# Patient Record
Sex: MALE | Race: WHITE | Employment: OTHER | URBAN - METROPOLITAN AREA
[De-identification: names, ages, dates, MRNs, and addresses within clinical notes are randomized per-mention and may not be internally consistent; named-entity substitution may affect disease eponyms.]

---

## 2017-01-04 NOTE — TELEPHONE ENCOUNTER
Pt is out town patient is prone to blood clots needs medication refilled right away needs 6 day supply      # [phone] please call when sent to pharmacy

## 2017-02-06 ENCOUNTER — HOSPITAL ENCOUNTER (OUTPATIENT)
Dept: GENERAL RADIOLOGY | Age: 57
End: 2017-02-06
Payer: MEDICARE

## 2017-02-06 ENCOUNTER — HOSPITAL ENCOUNTER (OUTPATIENT)
Dept: GENERAL RADIOLOGY | Age: 57
Discharge: HOME | End: 2017-02-06
Attending: PHYSICAL MEDICINE & REHABILITATION
Payer: MEDICARE

## 2017-02-06 DIAGNOSIS — R52 PAIN: ICD-10-CM

## 2017-02-06 PROCEDURE — 73564 X-RAY EXAM KNEE 4 OR MORE: CPT

## 2017-04-14 RX ORDER — VENLAFAXINE HYDROCHLORIDE 75 MG/1
CAPSULE, EXTENDED RELEASE ORAL
Qty: 90 CAP | Refills: 1 | Status: SHIPPED | OUTPATIENT
Start: 2017-04-14

## 2017-05-04 ENCOUNTER — OFFICE VISIT (OUTPATIENT)
Dept: INTERNAL MEDICINE CLINIC | Age: 57
End: 2017-05-04

## 2017-05-04 VITALS
HEART RATE: 100 BPM | HEIGHT: 71 IN | OXYGEN SATURATION: 99 % | BODY MASS INDEX: 31.5 KG/M2 | RESPIRATION RATE: 18 BRPM | SYSTOLIC BLOOD PRESSURE: 130 MMHG | WEIGHT: 225 LBS | DIASTOLIC BLOOD PRESSURE: 85 MMHG | TEMPERATURE: 97.1 F

## 2017-05-04 DIAGNOSIS — I10 ESSENTIAL HYPERTENSION: ICD-10-CM

## 2017-05-04 DIAGNOSIS — F51.01 PRIMARY INSOMNIA: Primary | ICD-10-CM

## 2017-05-04 DIAGNOSIS — M51.36 DDD (DEGENERATIVE DISC DISEASE), LUMBAR: ICD-10-CM

## 2017-05-04 DIAGNOSIS — F90.2 ATTENTION DEFICIT HYPERACTIVITY DISORDER (ADHD), COMBINED TYPE: ICD-10-CM

## 2017-05-04 DIAGNOSIS — C61 PROSTATE CANCER (HCC): ICD-10-CM

## 2017-05-04 RX ORDER — LISINOPRIL 20 MG/1
20 TABLET ORAL DAILY
Qty: 90 TAB | Refills: 1 | Status: SHIPPED | OUTPATIENT
Start: 2017-05-04

## 2017-05-04 RX ORDER — TEMAZEPAM 30 MG/1
30 CAPSULE ORAL
Qty: 90 CAP | Refills: 1 | Status: SHIPPED | OUTPATIENT
Start: 2017-05-04 | End: 2017-07-18 | Stop reason: SDUPTHER

## 2017-05-04 RX ORDER — METHYLPHENIDATE HYDROCHLORIDE 36 MG/1
TABLET ORAL
Qty: 90 TAB | Refills: 0 | Status: SHIPPED | OUTPATIENT
Start: 2017-05-04 | End: 2017-05-04 | Stop reason: SDUPTHER

## 2017-05-04 RX ORDER — METHYLPHENIDATE HYDROCHLORIDE 36 MG/1
TABLET ORAL
Qty: 90 TAB | Refills: 0 | Status: SHIPPED | OUTPATIENT
Start: 2017-08-04

## 2017-05-04 NOTE — PROGRESS NOTES
1. Have you been to the ER, urgent care clinic since your last visit? Hospitalized since your last visit?no    2. Have you seen or consulted any other health care providers outside of the [address] [place] [address] since your last visit? Include any pap smears or colon screening.  No    Chief Complaint   Patient presents with    Hypertension     6 months follow up     Not Fasting

## 2017-05-04 NOTE — PROGRESS NOTES
HISTORY OF PRESENT ILLNESS  Alida Lugo is a 59 y.o. male. HPI  Here for med refills. He is going on a cruise and will be out of his pain meds for a day so needs help here. He has controlled add and needs refills. He has controlled insomnia . He has prostate cancer so is off t now and will proceed with surgery. He is moving to [place] to live near his daughter and grand kids. Past Medical History:   Diagnosis Date    ADHD (attention deficit hyperactivity disorder)     DDD (degenerative disc disease), lumbar     DVT [facility]deep venous thrombosis) ([facility])     ED (erectile dysfunction) of organic origin     GOPAL (generalized anxiety disorder)     Genital herpes     GERD (gastroesophageal reflux disease)     Gross hematuria     History of pulmonary embolism     s/p IVC filter placement - on xarelto and plavix    Hypercholesterolemia     Hypertension     Hypotestosteronism     IVC (inferior vena cava obstruction)     GILLIAN on CPAP     Prostate cancer (HCC)     Psychiatric disorder     ATTENTION DEFICIT    PVD (peripheral vascular disease) ([facility])     Scoliosis     Unspecified adverse effect of anesthesia     WORK DURING [employer]     Current Outpatient Prescriptions   Medication Sig    lisinopril (PRINIVIL, ZESTRIL) 20 mg tablet Take 1 Tab by mouth daily.  temazepam (RESTORIL) 30 mg capsule Take 1 Cap by mouth nightly as needed for Sleep. Max Daily Amount: 30 mg. Indications: INSOMNIA    oxyMORphone 30 mg PO Take 1 Tab by mouth every twelve (12) hours. Max Daily Amount: 2 Tabs.  [START ON 8/4/2017] methylphenidate ER 36 mg 24 hr tab Earliest Fill Date: 8/4/17. Take 1 tablet by mouth daily.  venlafaxine-SR (EFFEXOR-XR) 75 mg capsule Take 1 capsule by mouth  daily    rivaroxaban (XARELTO) 20 mg tab tablet Take 1 Tab by mouth daily (with breakfast).     BD LUER-EDSON SYRINGE 3 mL 25 gauge x 1\" syrg USE AS DIRECTED WITH TESTOTERONE    omeprazole (PRILOSEC) 40 mg capsule Take 1 capsule by mouth  daily  Syringe, Disposable, 3 mL syrg 25 Units by Does Not Apply route as needed.  Needle, Disp, 23 G 23 gauge x 1 1/4\" ndle 1 Units by Does Not Apply route as needed.  Needle, Disp, 18 G 18 gauge x 1\" ndle 100 Units by Does Not Apply route as needed.  SUMAtriptan (IMITREX) 20 mg/actuation nasal spray 1 [place] by Both Nostrils route as needed for Migraine.  oxymorphone 10 mg tablet Take 10 mg by mouth every four (4) hours as needed. No current facility-administered medications for this visit. Review of Systems   All other systems reviewed and are negative. Visit Vitals    /85 (BP 1 Location: Left arm, BP Patient Position: At rest)    Pulse 100    Temp 97.1 °F (36.2 °C) (Oral)    Resp 18    Ht 5' 11\" (1.803 m)    Wt 225 lb (102.1 kg)    SpO2 99%    BMI 31.38 kg/m2       Physical Exam   Constitutional: He appears well-developed and well-nourished. Cardiovascular: Normal rate, regular rhythm and normal heart sounds. Pulmonary/Chest: Effort normal and breath sounds normal. No respiratory distress. He has no wheezes. He has no rales. Psychiatric: He has a normal mood and affect. His behavior is normal. Judgment and thought content normal.   Nursing note and vitals reviewed. ASSESSMENT and Ruben Hull was seen today for hypertension. Diagnoses and all orders for this visit:    Primary insomnia  -     temazepam (RESTORIL) 30 mg capsule; Take 1 Cap by mouth nightly as needed for Sleep. Max Daily Amount: 30 mg. Indications: INSOMNIA  The current medical regimen is effective;  continue present plan and medications. Attention deficit hyperactivity disorder (ADHD), combined type  -     methylphenidate ER 36 mg 24 hr tab; Take 1 tablet by mouth daily. -     methylphenidate ER 36 mg 24 hr tab; Earliest Fill Date: 8/4/17. Take 1 tablet by mouth daily. The current medical regimen is effective;  continue present plan and medications.     Essential hypertension  - lisinopril (PRINIVIL, ZESTRIL) 20 mg tablet; Take 1 Tab by mouth daily. The current medical regimen is effective;  continue present plan and medications. DDD (degenerative disc disease), lumbar  -     oxyMORphone 30 mg PO; Take 1 Tab by mouth every twelve (12) hours. Max Daily Amount: 2 Tabs. Prostate cancer [facility])  Concur with surgical plans.       Reviewed plan of care with the patient who has provided input and agrees with the goals

## 2017-05-29 RX ORDER — OMEPRAZOLE 40 MG/1
CAPSULE, DELAYED RELEASE ORAL
Qty: 90 CAP | Refills: 1 | Status: SHIPPED | OUTPATIENT
Start: 2017-05-29

## 2017-06-28 ENCOUNTER — PATIENT MESSAGE (OUTPATIENT)
Dept: INTERNAL MEDICINE CLINIC | Age: 57
End: 2017-06-28

## 2017-06-28 DIAGNOSIS — M51.36 DDD (DEGENERATIVE DISC DISEASE), LUMBAR: Primary | ICD-10-CM

## 2017-06-29 NOTE — TELEPHONE ENCOUNTER
From: Yuliya Damian  To: Kerrie Garza MD  Sent: 6/28/2017 1:18 PM EDT  Subject: Referral Request    Dr. Rosangela Samson,    I am moving to [place] tomorrow and will not be able to see my doctor on 7/11, which is the same date that my medications run out. I was going to begin step-down with her but cant because of my move. I am immediately beginning 40 days of radiation treatment in [place] and its not recommended to step down during the treatment. I found a Pain Management (PM) doctor in [facility] that can see me on 7/7 if you provide him with a referral. My PM doctor does not provide referrals but will provide all of my medical records, notes, etc.     Im urgently asking you to PLEASE simply provide a referral for me so I do not run out of medications on 7/11. Dr. Keyon Marie  Fax number is: [phone]    Thank you in advance.     Cristina Sheffield

## 2017-07-18 DIAGNOSIS — F51.01 PRIMARY INSOMNIA: ICD-10-CM

## 2017-07-18 RX ORDER — TEMAZEPAM 30 MG/1
30 CAPSULE ORAL
Qty: 90 CAP | Refills: 0 | Status: SHIPPED | OUTPATIENT
Start: 2017-07-18

## 2017-07-18 NOTE — TELEPHONE ENCOUNTER
Pt is requesting a 10 pills for this medication. Pt accidentally left pills in [place] and pt is in [place] for work and leaves next week. Pt will provide pharmacy information when call is return.  Please give pt a call back

## 2017-07-19 NOTE — TELEPHONE ENCOUNTER
Spoke with patient.  States that pharmacy has received the prescription no further action needed at this time